# Patient Record
Sex: FEMALE | Race: BLACK OR AFRICAN AMERICAN
[De-identification: names, ages, dates, MRNs, and addresses within clinical notes are randomized per-mention and may not be internally consistent; named-entity substitution may affect disease eponyms.]

---

## 2017-10-24 ENCOUNTER — HOSPITAL ENCOUNTER (EMERGENCY)
Dept: HOSPITAL 62 - ER | Age: 22
Discharge: HOME | End: 2017-10-24
Payer: OTHER GOVERNMENT

## 2017-10-24 VITALS — SYSTOLIC BLOOD PRESSURE: 115 MMHG | DIASTOLIC BLOOD PRESSURE: 87 MMHG

## 2017-10-24 DIAGNOSIS — Z88.0: ICD-10-CM

## 2017-10-24 DIAGNOSIS — Z91.010: ICD-10-CM

## 2017-10-24 DIAGNOSIS — Z88.8: ICD-10-CM

## 2017-10-24 DIAGNOSIS — J45.909: ICD-10-CM

## 2017-10-24 DIAGNOSIS — Z91.018: ICD-10-CM

## 2017-10-24 DIAGNOSIS — R11.2: ICD-10-CM

## 2017-10-24 DIAGNOSIS — B96.81: ICD-10-CM

## 2017-10-24 DIAGNOSIS — K28.9: Primary | ICD-10-CM

## 2017-10-24 DIAGNOSIS — Z98.84: ICD-10-CM

## 2017-10-24 LAB
ALBUMIN SERPL-MCNC: 4.1 G/DL (ref 3.5–5)
ALP SERPL-CCNC: 107 U/L (ref 38–126)
ALT SERPL-CCNC: 151 U/L (ref 9–52)
ANION GAP SERPL CALC-SCNC: 14 MMOL/L (ref 5–19)
APPEARANCE UR: CLEAR
AST SERPL-CCNC: 50 U/L (ref 14–36)
BASOPHILS # BLD AUTO: 0 10^3/UL (ref 0–0.2)
BASOPHILS NFR BLD AUTO: 0.2 % (ref 0–2)
BILIRUB DIRECT SERPL-MCNC: 0.4 MG/DL (ref 0–0.4)
BILIRUB SERPL-MCNC: 0.8 MG/DL (ref 0.2–1.3)
BILIRUB UR QL STRIP: NEGATIVE
BUN SERPL-MCNC: 8 MG/DL (ref 7–20)
CALCIUM: 9.2 MG/DL (ref 8.4–10.2)
CHLORIDE SERPL-SCNC: 107 MMOL/L (ref 98–107)
CO2 SERPL-SCNC: 21 MMOL/L (ref 22–30)
CREAT SERPL-MCNC: 0.74 MG/DL (ref 0.52–1.25)
EOSINOPHIL # BLD AUTO: 0.2 10^3/UL (ref 0–0.6)
EOSINOPHIL NFR BLD AUTO: 2 % (ref 0–6)
ERYTHROCYTE [DISTWIDTH] IN BLOOD BY AUTOMATED COUNT: 14.4 % (ref 11.5–14)
GLUCOSE SERPL-MCNC: 80 MG/DL (ref 75–110)
GLUCOSE UR STRIP-MCNC: NEGATIVE MG/DL
HCT VFR BLD CALC: 38.9 % (ref 36–47)
HGB BLD-MCNC: 13.1 G/DL (ref 12–15.5)
HGB HCT DIFFERENCE: 0.4
KETONES UR STRIP-MCNC: NEGATIVE MG/DL
LIPASE SERPL-CCNC: 51.4 U/L (ref 23–300)
LYMPHOCYTES # BLD AUTO: 3.5 10^3/UL (ref 0.5–4.7)
LYMPHOCYTES NFR BLD AUTO: 32.9 % (ref 13–45)
MCH RBC QN AUTO: 28.5 PG (ref 27–33.4)
MCHC RBC AUTO-ENTMCNC: 33.6 G/DL (ref 32–36)
MCV RBC AUTO: 85 FL (ref 80–97)
MONOCYTES # BLD AUTO: 0.6 10^3/UL (ref 0.1–1.4)
MONOCYTES NFR BLD AUTO: 6 % (ref 3–13)
NEUTROPHILS # BLD AUTO: 6.2 10^3/UL (ref 1.7–8.2)
NEUTS SEG NFR BLD AUTO: 58.9 % (ref 42–78)
NITRITE UR QL STRIP: NEGATIVE
PH UR STRIP: 6 [PH] (ref 5–9)
POTASSIUM SERPL-SCNC: 4.1 MMOL/L (ref 3.6–5)
PROT SERPL-MCNC: 7.8 G/DL (ref 6.3–8.2)
PROT UR STRIP-MCNC: NEGATIVE MG/DL
RBC # BLD AUTO: 4.6 10^6/UL (ref 3.72–5.28)
SODIUM SERPL-SCNC: 141.9 MMOL/L (ref 137–145)
SP GR UR STRIP: 1.01
UROBILINOGEN UR-MCNC: NEGATIVE MG/DL (ref ?–2)
WBC # BLD AUTO: 10.5 10^3/UL (ref 4–10.5)

## 2017-10-24 PROCEDURE — 85025 COMPLETE CBC W/AUTO DIFF WBC: CPT

## 2017-10-24 PROCEDURE — 96361 HYDRATE IV INFUSION ADD-ON: CPT

## 2017-10-24 PROCEDURE — 81025 URINE PREGNANCY TEST: CPT

## 2017-10-24 PROCEDURE — 96374 THER/PROPH/DIAG INJ IV PUSH: CPT

## 2017-10-24 PROCEDURE — 96375 TX/PRO/DX INJ NEW DRUG ADDON: CPT

## 2017-10-24 PROCEDURE — 99284 EMERGENCY DEPT VISIT MOD MDM: CPT

## 2017-10-24 PROCEDURE — 36415 COLL VENOUS BLD VENIPUNCTURE: CPT

## 2017-10-24 PROCEDURE — 81001 URINALYSIS AUTO W/SCOPE: CPT

## 2017-10-24 PROCEDURE — 83690 ASSAY OF LIPASE: CPT

## 2017-10-24 PROCEDURE — 80053 COMPREHEN METABOLIC PANEL: CPT

## 2017-10-24 NOTE — ER DOCUMENT REPORT
ED Medical Screen (RME)





- General


Chief Complaint: Vomiting


Stated Complaint: ABDOMINAL PAIN


Time Seen by Provider: 10/24/17 18:34


Notes: 





Patient complains of severe epigastric abdominal pain.  She states is making 

her nauseous and she has vomiting.  She states she is unable to "keep anything 

down".  She states she is recently diagnosed with H. pylori and is currently 

taking 2 medications for.  She states the pain started before she had that 

diagnosis.  She also states that she just had a menstrual cycle that lasted 

approximately 5 weeks.


TRAVEL OUTSIDE OF THE [place] IN LAST 30 DAYS: No





- Related Data


Allergies/Adverse Reactions: 


 





amoxicillin [Amoxicillin] Allergy (Verified 10/24/17 18:31)


 


lisinopril Allergy (Verified 10/24/17 18:31)


 


peanut [Peanut] Allergy (Verified 10/24/17 18:31)


 


mushrooms Allergy (Uncoded 10/24/17 18:31)


 











Past Medical History





- Social History


Frequency of alcohol use: None


Drug Abuse: None


Pulmonary Medical History: Reports: Hx Asthma


Neurological Medical History: Reports: Hx Migraine


Renal/ Medical History: Reports: Hx Pelvic Inflammatory Disease.  Denies: Hx 

Peritoneal Dialysis


Psychiatric Medical History: Reports: Hx Bipolar Disorder


Past Surgical History: Reports: Hx Dilation and Curettage - elective  3/

11, Hx Gynecologic Surgery - D&C





- Immunizations


Hx Diphtheria, Pertussis, Tetanus Vaccination: Yes

## 2018-08-06 ENCOUNTER — HOSPITAL ENCOUNTER (EMERGENCY)
Dept: HOSPITAL 62 - ER | Age: 23
Discharge: HOME | End: 2018-08-06
Payer: OTHER GOVERNMENT

## 2018-08-06 VITALS — SYSTOLIC BLOOD PRESSURE: 131 MMHG | DIASTOLIC BLOOD PRESSURE: 81 MMHG

## 2018-08-06 DIAGNOSIS — F17.210: ICD-10-CM

## 2018-08-06 DIAGNOSIS — G40.909: Primary | ICD-10-CM

## 2018-08-06 DIAGNOSIS — Z88.0: ICD-10-CM

## 2018-08-06 DIAGNOSIS — R53.1: ICD-10-CM

## 2018-08-06 LAB
ADD MANUAL DIFF: NO
ALBUMIN SERPL-MCNC: 4.1 G/DL (ref 3.5–5)
ALP SERPL-CCNC: 84 U/L (ref 38–126)
ALT SERPL-CCNC: 29 U/L (ref 9–52)
ANION GAP SERPL CALC-SCNC: 12 MMOL/L (ref 5–19)
APPEARANCE UR: CLEAR
APTT PPP: YELLOW S
AST SERPL-CCNC: 21 U/L (ref 14–36)
BARBITURATES UR QL SCN: NEGATIVE
BASOPHILS # BLD AUTO: 0.1 10^3/UL (ref 0–0.2)
BASOPHILS NFR BLD AUTO: 0.8 % (ref 0–2)
BILIRUB DIRECT SERPL-MCNC: 0.2 MG/DL (ref 0–0.4)
BILIRUB SERPL-MCNC: 0.7 MG/DL (ref 0.2–1.3)
BILIRUB UR QL STRIP: NEGATIVE
BUN SERPL-MCNC: 10 MG/DL (ref 7–20)
CALCIUM: 9.3 MG/DL (ref 8.4–10.2)
CHLORIDE SERPL-SCNC: 106 MMOL/L (ref 98–107)
CO2 SERPL-SCNC: 25 MMOL/L (ref 22–30)
EOSINOPHIL # BLD AUTO: 0.2 10^3/UL (ref 0–0.6)
EOSINOPHIL NFR BLD AUTO: 1.7 % (ref 0–6)
ERYTHROCYTE [DISTWIDTH] IN BLOOD BY AUTOMATED COUNT: 14.6 % (ref 11.5–14)
ETHANOL SERPL-MCNC: < 10 MG/DL
GLUCOSE SERPL-MCNC: 76 MG/DL (ref 75–110)
GLUCOSE UR STRIP-MCNC: NEGATIVE MG/DL
HCT VFR BLD CALC: 38.5 % (ref 36–47)
HGB BLD-MCNC: 12.7 G/DL (ref 12–15.5)
KETONES UR STRIP-MCNC: (no result) MG/DL
LYMPHOCYTES # BLD AUTO: 2.9 10^3/UL (ref 0.5–4.7)
LYMPHOCYTES NFR BLD AUTO: 29.5 % (ref 13–45)
MCH RBC QN AUTO: 28.4 PG (ref 27–33.4)
MCHC RBC AUTO-ENTMCNC: 32.9 G/DL (ref 32–36)
MCV RBC AUTO: 86 FL (ref 80–97)
METHADONE UR QL SCN: NEGATIVE
MONOCYTES # BLD AUTO: 0.6 10^3/UL (ref 0.1–1.4)
MONOCYTES NFR BLD AUTO: 6.3 % (ref 3–13)
NEUTROPHILS # BLD AUTO: 6.1 10^3/UL (ref 1.7–8.2)
NEUTS SEG NFR BLD AUTO: 61.7 % (ref 42–78)
NITRITE UR QL STRIP: POSITIVE
PCP UR QL SCN: NEGATIVE
PH UR STRIP: 5 [PH] (ref 5–9)
PLATELET # BLD: 420 10^3/UL (ref 150–450)
POTASSIUM SERPL-SCNC: 4 MMOL/L (ref 3.6–5)
PROT SERPL-MCNC: 7.9 G/DL (ref 6.3–8.2)
PROT UR STRIP-MCNC: NEGATIVE MG/DL
RBC # BLD AUTO: 4.46 10^6/UL (ref 3.72–5.28)
SODIUM SERPL-SCNC: 143 MMOL/L (ref 137–145)
SP GR UR STRIP: 1.02
TOTAL CELLS COUNTED % (AUTO): 100 %
URINE AMPHETAMINES SCREEN: NEGATIVE
URINE BENZODIAZEPINES SCREEN: NEGATIVE
URINE COCAINE SCREEN: NEGATIVE
URINE MARIJUANA (THC) SCREEN: NEGATIVE
UROBILINOGEN UR-MCNC: NEGATIVE MG/DL (ref ?–2)
WBC # BLD AUTO: 9.9 10^3/UL (ref 4–10.5)

## 2018-08-06 PROCEDURE — 85025 COMPLETE CBC W/AUTO DIFF WBC: CPT

## 2018-08-06 PROCEDURE — 36415 COLL VENOUS BLD VENIPUNCTURE: CPT

## 2018-08-06 PROCEDURE — 99285 EMERGENCY DEPT VISIT HI MDM: CPT

## 2018-08-06 PROCEDURE — 80053 COMPREHEN METABOLIC PANEL: CPT

## 2018-08-06 PROCEDURE — 81001 URINALYSIS AUTO W/SCOPE: CPT

## 2018-08-06 PROCEDURE — 84703 CHORIONIC GONADOTROPIN ASSAY: CPT

## 2018-08-06 PROCEDURE — 83735 ASSAY OF MAGNESIUM: CPT

## 2018-08-06 PROCEDURE — 80307 DRUG TEST PRSMV CHEM ANLYZR: CPT

## 2018-08-06 PROCEDURE — 96361 HYDRATE IV INFUSION ADD-ON: CPT

## 2018-08-06 PROCEDURE — 96374 THER/PROPH/DIAG INJ IV PUSH: CPT

## 2018-08-06 NOTE — ER DOCUMENT REPORT
ED Medical Screen (RME)





- General


Chief Complaint: General Weakness


Stated Complaint: SEIZURES, FATIGUE


Time Seen by Provider: 18 15:50


Notes: 





23 years old female presents today with multiple seizures seizures back-to-back 

total of 5 for the last 3 days.  She means a day 5 seizures.  She has run out 

of her Keppra last Thursday.


Postictally feeling lightheaded and sleepy and lethargic.  Denies any fever 

chills cough other constitutional symptoms.  Denies any dysuria frequency 

urgency.


TRAVEL OUTSIDE OF THE [place] IN LAST 30 DAYS: No





- Related Data


Allergies/Adverse Reactions: 


 





amoxicillin [Amoxicillin] Allergy (Verified 18 15:13)


 


lisinopril Allergy (Verified 18 15:13)


 


peanut [Peanut] Allergy (Verified 18 15:13)


 


mushrooms Allergy (Uncoded 10/24/17 18:31)


 











Past Medical History





- Social History


Chew tobacco use (# tins/day): No


Frequency of alcohol use: Social


Drug Abuse: None


Pulmonary Medical History: Reports: Hx Asthma


Neurological Medical History: Reports: Hx Migraine


Renal/ Medical History: Reports: Hx Pelvic Inflammatory Disease.  Denies: Hx 

Peritoneal Dialysis


Psychiatric Medical History: Reports: Hx Bipolar Disorder


Past Surgical History: Reports: Hx Dilation and Curettage - elective  3/

11, Hx Gynecologic Surgery - D&C





- Immunizations


Hx Diphtheria, Pertussis, Tetanus Vaccination: Yes





Physical Exam





- Vital signs


Vitals: 





 











Temp Pulse Resp BP Pulse Ox


 


 98.8 F   83   18   134/112 H  99 


 


 18 15:29  18 15:29  18 15:29  18 15:29  18 15:29














Course





- Vital Signs


Vital signs: 





 











Temp Pulse Resp BP Pulse Ox


 


 98.8 F   83   18   134/112 H  99 


 


 18 15:29  18 15:29  18 15:29  18 15:29  18 15:29














Doctor's Discharge





- Discharge


Referrals: 


ANGELA TREJO MD [Primary Care Provider] - Follow up as needed

## 2018-08-06 NOTE — ER DOCUMENT REPORT
ED Seizure





<CRIS WILSON - Last Filed: 18 19:48>





- General


Mode of Arrival: Ambulatory


Information source: Patient





<YANI YBARRA - Last Filed: 18 20:05>





- General


Chief Complaint: General Weakness


Stated Complaint: SEIZURES, FATIGUE


Time Seen by Provider: 18 15:50


Notes: 


23-year-old female that presents to the emergency department today with 

complaints of seizures for the last few days.  Patient has a known seizure 

disorder and is currently taking Keppra and Vimpat.  Patient states that she 

has not had her Keppra for about a week as her  got them filled and then 

"went onto the field for training" and accidentally took it with him.  Patient 

states she has had multiple seizures over the last few days and now feels "very 

tired". (TATEYANI)





- Related Data


Allergies/Adverse Reactions: 


 





amoxicillin [Amoxicillin] Allergy (Verified 18 15:13)


 


lisinopril Allergy (Verified 18 15:13)


 


peanut [Peanut] Allergy (Verified 18 15:13)


 


mushrooms Allergy (Uncoded 10/24/17 18:31)


 











Past Medical History





- General


Information source: Patient





- Social History


Smoking Status: Current Every Day Smoker


Cigarette use (# per day): Yes


Chew tobacco use (# tins/day): No


Frequency of alcohol use: Social


Drug Abuse: None


Lives with: Family


Family History: Reviewed & Not Pertinent


Patient has suicidal ideation: No


Patient has homicidal ideation: No


Pulmonary Medical History: Reports: Hx Asthma


Neurological Medical History: Reports: Hx Migraine, Hx Seizures


Renal/ Medical History: Reports: Hx Pelvic Inflammatory Disease


Psychiatric Medical History: Reports: Hx Bipolar Disorder


Past Surgical History: Reports: Hx Dilation and Curettage - elective  3/

11, Hx Gynecologic Surgery - D&C





- Immunizations


Hx Diphtheria, Pertussis, Tetanus Vaccination: Yes





<YANI YBARRA - Last Filed: 18 20:05>





Review of Systems





- Review of Systems


Constitutional: No symptoms reported


EENT: No symptoms reported


Cardiovascular: No symptoms reported


Respiratory: No symptoms reported


Gastrointestinal: No symptoms reported


Genitourinary: No symptoms reported


Female Genitourinary: No symptoms reported


Musculoskeletal: No symptoms reported


Skin: No symptoms reported


Hematologic/Lymphatic: No symptoms reported


Neurological/Psychological: See HPI, Seizure


-: Yes All other systems reviewed and negative





<YANI YBARRA - Last Filed: 18 20:05>





Physical Exam





<CRIS WILSON - Last Filed: 18 19:48>





<YANI YBARRA - Last Filed: 18 20:05>





- Vital signs


Vitals: 


 











Temp Pulse Resp BP Pulse Ox


 


 98.8 F   83   18   134/112 H  99 


 


 18 15:29  18 15:29  18 15:29  18 15:29  18 15:29














- Notes


Notes: 





Physical Exam:


 


General: Alert, appears well. 


 


HEENT: Normocephalic. Atraumatic. PERRL. Extraocular movements intact. 

Oropharynx clear.


 


Neck: Supple. Non-tender.


 


Respiratory: No respiratory distress. Clear and equal breath sounds bilaterally.


 


Cardiovascular: Regular rate and rhythm. 


 


Abdominal: Normal Inspection. Non-tender. No distension. Normal Bowel Sounds. 


 


Back: Non-tender. No deformity or step off.


 


Extremities: Moves all four extremities.


Upper extremities: Normal inspection. Normal ROM.  


Lower extremities: Normal inspection. No edema. Normal ROM.


 


Neurological: Normal cognition. AAOx4. Initially deliberate speech that was 

difficult to understand, speech has normalized.


 


Psychological: Normal affect. Normal Mood. 


 


Skin: Warm. Dry. Normal color. (YANI YBARRA)





Course





- Laboratory


Result Diagrams: 


 18 16:24





 18 17:49





<CRIS WILSON - Last Filed: 18 19:48>





- Laboratory


Result Diagrams: 


 18 16:24





 18 17:49





<YANI YBARRA - Last Filed: 18 20:05>





- Re-evaluation


Re-evalutation: 





18 19:48


The patient states that her  is home from the field and that he can 

definitely go out to enable hospital pharmacy and  her prescription.  

There was a prescription for Keppra 750 mg #60 filled on 2018 but by her 

history is never picked up.


She normally takes 1000 mg 3 times a day, so I told her she can take 2 of the 

750 mg Keppra twice daily and get the same dose until she can see a primary 

care provider to reinstate her regular prescription.


She will also discuss the problem with the Vimpat with a counselor at a [facility].(According to the patient, [facility] pharmacy did not carry Vimpat

, but the neurologist she had been seeing was giving her samples from the office

).





The patient was sound asleep when I woke her up to discuss disposition.  She is 

actually more alert smiling with normal speech pattern now which she did not 

have initially.  She states she has a friend in the waiting room that will take 

her home.  She is smiling and quite happy to be going home now.


 (CRIS WILSON)





- Vital Signs


Vital signs: 


 











Temp Pulse Resp BP Pulse Ox


 


 98.8 F   83   26 H  130/119 H  96 


 


 18 15:29  18 15:29  18 19:01  18 19:01  18 19:01














- Laboratory


Laboratory results interpreted by me: 


 











  18





  16:24 18:44


 


RDW  14.6 H 


 


Urine Ketones   TRACE H


 


Urine Nitrite   POSITIVE H














Discharge





<CRIS WILSON - Last Filed: 18 19:48>





<YANI YBARRA - Last Filed: 18 20:05>





- Discharge


Clinical Impression: 


 Seizures, Has run out of medications





Condition: Stable


Disposition: HOME, SELF-CARE


Additional Instructions: 


Seizure, Known Epileptic





     You have had a seizure.  Seizures may "break through" in an epileptic due 

to stress of infection or injury, a change in blood chemistry, or drug and 

alcohol use.  Another common cause is failure to take medication as prescribed.


     Your doctor has evaluated your situation for the likely cause of this 

seizure.  It is important that you follow his advice concerning any medication 

changes and follow-up care.  Further testing of anti-seizure medication levels 

in your blood may be necessary.


     If you have a 's license, it's important that you DO NOT DRIVE until 

given permission by your physician.  This seizure must be reported to the 

's license bureau.


     Call the doctor or return if seizures recur, or if new or unusual symptoms 

arise -- such as severe headache, confusion, excessive sleepiness, local 

weakness or numbness, neck stiffness, or fever.











********************************************************************************

***************************************





Be sure you get your Keppra prescription from the [facility] so you 

can continue taking the medicine today.


Follow-up with your primary care provider to discuss how to manage your seizure 

medications.





RETURN TO THE EMERGENCY ROOM IF ANY NEW OR WORSENING SYMPTOMS.








Bobby Attestation: 





18 17:10


I personally performed the services described in the documentation, reviewed 

and edited the documentation which was dictated to the scribe in my presence, 

and it accurately records my words and actions. (YANI YBARRA)





Scribe Documentation





- Scribe


Written by Bobby:: Bobby Fragoso, 2018


acting as scribe for :: Niall





<YANI YBARRA - Last Filed: 18 20:05>

## 2018-10-10 ENCOUNTER — HOSPITAL ENCOUNTER (EMERGENCY)
Dept: HOSPITAL 62 - ER | Age: 23
Discharge: HOME | End: 2018-10-10
Payer: OTHER GOVERNMENT

## 2018-10-10 VITALS — SYSTOLIC BLOOD PRESSURE: 138 MMHG | DIASTOLIC BLOOD PRESSURE: 117 MMHG

## 2018-10-10 DIAGNOSIS — Z88.0: ICD-10-CM

## 2018-10-10 DIAGNOSIS — Z91.010: ICD-10-CM

## 2018-10-10 DIAGNOSIS — R11.0: ICD-10-CM

## 2018-10-10 DIAGNOSIS — R06.02: ICD-10-CM

## 2018-10-10 DIAGNOSIS — R07.9: Primary | ICD-10-CM

## 2018-10-10 DIAGNOSIS — F17.200: ICD-10-CM

## 2018-10-10 DIAGNOSIS — I10: ICD-10-CM

## 2018-10-10 LAB
ADD MANUAL DIFF: NO
ALBUMIN SERPL-MCNC: 3.9 G/DL (ref 3.5–5)
ALP SERPL-CCNC: 76 U/L (ref 38–126)
ALT SERPL-CCNC: 20 U/L (ref 9–52)
ANION GAP SERPL CALC-SCNC: 9 MMOL/L (ref 5–19)
AST SERPL-CCNC: 18 U/L (ref 14–36)
BASOPHILS # BLD AUTO: 0 10^3/UL (ref 0–0.2)
BASOPHILS NFR BLD AUTO: 0.3 % (ref 0–2)
BILIRUB DIRECT SERPL-MCNC: 0.3 MG/DL (ref 0–0.4)
BILIRUB SERPL-MCNC: 0.6 MG/DL (ref 0.2–1.3)
BUN SERPL-MCNC: 11 MG/DL (ref 7–20)
CALCIUM: 9.6 MG/DL (ref 8.4–10.2)
CHLORIDE SERPL-SCNC: 107 MMOL/L (ref 98–107)
CK MB SERPL-MCNC: 0.23 NG/ML (ref ?–4.55)
CK SERPL-CCNC: 59 U/L (ref 30–135)
CO2 SERPL-SCNC: 25 MMOL/L (ref 22–30)
EOSINOPHIL # BLD AUTO: 0.4 10^3/UL (ref 0–0.6)
EOSINOPHIL NFR BLD AUTO: 3.3 % (ref 0–6)
ERYTHROCYTE [DISTWIDTH] IN BLOOD BY AUTOMATED COUNT: 14.7 % (ref 11.5–14)
GLUCOSE SERPL-MCNC: 87 MG/DL (ref 75–110)
HCT VFR BLD CALC: 35.2 % (ref 36–47)
HGB BLD-MCNC: 11.8 G/DL (ref 12–15.5)
LYMPHOCYTES # BLD AUTO: 3.4 10^3/UL (ref 0.5–4.7)
LYMPHOCYTES NFR BLD AUTO: 31.7 % (ref 13–45)
MCH RBC QN AUTO: 28.7 PG (ref 27–33.4)
MCHC RBC AUTO-ENTMCNC: 33.6 G/DL (ref 32–36)
MCV RBC AUTO: 85 FL (ref 80–97)
MONOCYTES # BLD AUTO: 0.6 10^3/UL (ref 0.1–1.4)
MONOCYTES NFR BLD AUTO: 6 % (ref 3–13)
NEUTROPHILS # BLD AUTO: 6.3 10^3/UL (ref 1.7–8.2)
NEUTS SEG NFR BLD AUTO: 58.7 % (ref 42–78)
PLATELET # BLD: 396 10^3/UL (ref 150–450)
POTASSIUM SERPL-SCNC: 4.6 MMOL/L (ref 3.6–5)
PROT SERPL-MCNC: 7.8 G/DL (ref 6.3–8.2)
RBC # BLD AUTO: 4.12 10^6/UL (ref 3.72–5.28)
SODIUM SERPL-SCNC: 140.8 MMOL/L (ref 137–145)
TOTAL CELLS COUNTED % (AUTO): 100 %
TROPONIN I SERPL-MCNC: < 0.012 NG/ML
WBC # BLD AUTO: 10.7 10^3/UL (ref 4–10.5)

## 2018-10-10 PROCEDURE — 85025 COMPLETE CBC W/AUTO DIFF WBC: CPT

## 2018-10-10 PROCEDURE — 80053 COMPREHEN METABOLIC PANEL: CPT

## 2018-10-10 PROCEDURE — 82553 CREATINE MB FRACTION: CPT

## 2018-10-10 PROCEDURE — 99284 EMERGENCY DEPT VISIT MOD MDM: CPT

## 2018-10-10 PROCEDURE — 84484 ASSAY OF TROPONIN QUANT: CPT

## 2018-10-10 PROCEDURE — 82550 ASSAY OF CK (CPK): CPT

## 2018-10-10 PROCEDURE — 93010 ELECTROCARDIOGRAM REPORT: CPT

## 2018-10-10 PROCEDURE — 36415 COLL VENOUS BLD VENIPUNCTURE: CPT

## 2018-10-10 PROCEDURE — 93005 ELECTROCARDIOGRAM TRACING: CPT

## 2018-10-10 PROCEDURE — 71046 X-RAY EXAM CHEST 2 VIEWS: CPT

## 2018-10-10 NOTE — EKG REPORT
SEVERITY:- BORDERLINE ECG -

SINUS RHYTHM

BORDERLINE T ABNORMALITIES, INFERIOR LEADS

:

Confirmed by: Patsy Way MD 10-Oct-2018 18:14:47

## 2018-10-10 NOTE — RADIOLOGY REPORT (SQ)
EXAM DESCRIPTION:  CHEST 2 VIEWS



COMPLETED DATE/TIME:  10/10/2018 5:53 pm



REASON FOR STUDY:  chest pain



COMPARISON:  None.



EXAM PARAMETERS:  NUMBER OF VIEWS: two views

TECHNIQUE: Digital Frontal and Lateral radiographic views of the chest acquired.

RADIATION DOSE: NA

LIMITATIONS: none



FINDINGS:  LUNGS AND PLEURA: No opacities, masses or pneumothorax. No pleural effusion.

MEDIASTINUM AND HILAR STRUCTURES: No masses or contour abnormalities.

HEART AND VASCULAR STRUCTURES: Heart normal size.  No evidence for failure.

BONES: No acute findings.

HARDWARE: None in the chest.

OTHER: No other significant finding.



IMPRESSION:  NO ACUTE RADIOGRAPHIC FINDING IN THE CHEST.



TECHNICAL DOCUMENTATION:  JOB ID:  [id number]

[id number]

 2011 [employer]- All Rights Reserved



Reading location - IP/workstation name: [email or web address]

## 2018-10-10 NOTE — ER DOCUMENT REPORT
ED General





- General


Chief Complaint: Chest Pain


Stated Complaint: CHEST PAIN, SHORTNESS OF BREATH


Time Seen by Provider: 10/10/18 17:04


TRAVEL OUTSIDE OF THE [place] IN LAST 30 DAYS: No





- HPI


Patient complains to provider of: Chest wall pain shortness of breath


Notes: 





Patient coming in for chest wall pain shortness of breath states ongoing for 

the last 2 days.  Patient states left side of her chest is like it is burning 

very sensitive to touch.  Patient states causing her to have some shortness of 

breath.  Patient does smoke denies any recent travel denies any fevers chills 

nausea vomiting diarrhea.  Patient resting comfortably upon my evaluation.  

Patient denies any recent trauma.  Denies any recent illnesses no URIs.  

Patient states the left chest wall is very tender to touch reproducing the 

burning sensation.  Denies any rashes





- Related Data


Allergies/Adverse Reactions: 


 





amoxicillin [Amoxicillin] Allergy (Verified 18 15:13)


 


lisinopril Allergy (Verified 18 15:13)


 


peanut [Peanut] Allergy (Verified 18 15:13)


 


mushrooms Allergy (Uncoded 10/24/17 18:31)


 











Past Medical History





- Social History


Smoking Status: Current Every Day Smoker


Chew tobacco use (# tins/day): No


Frequency of alcohol use: Social


Family History: Reviewed & Not Pertinent


Patient has suicidal ideation: No


Patient has homicidal ideation: No





- Past Medical History


Cardiac Medical History: Reports: Hx Hypertension


Pulmonary Medical History: Reports: Hx Asthma


Neurological Medical History: Reports: Hx Migraine, Hx Seizures


Renal/ Medical History: Reports: Hx Pelvic Inflammatory Disease.  Denies: Hx 

Peritoneal Dialysis


Psychiatric Medical History: Reports: Hx Bipolar Disorder


Past Surgical History: Reports: Hx Dilation and Curettage - elective  3/

11, Hx Gynecologic Surgery - D&C





- Immunizations


Hx Diphtheria, Pertussis, Tetanus Vaccination: Yes





Review of Systems





- Review of Systems


Constitutional: No symptoms reported


EENT: No symptoms reported


Cardiovascular: Chest pain


Respiratory: Short of breath


Gastrointestinal: No symptoms reported


Genitourinary: No symptoms reported


Female Genitourinary: No symptoms reported


Musculoskeletal: No symptoms reported


Skin: No symptoms reported


Hematologic/Lymphatic: No symptoms reported


Neurological/Psychological: No symptoms reported





Physical Exam





- Vital signs


Vitals: 


 











Temp Pulse Resp BP Pulse Ox


 


 99.1 F   95   18   138/117 H  100 


 


 10/10/18 16:32  10/10/18 16:32  10/10/18 16:32  10/10/18 16:32  10/10/18 16:32











Interpretation: Normal





- General


General appearance: Appears well, Alert





- HEENT


Head: Normocephalic, Atraumatic


Eyes: Normal


Pupils: PERRL





- Respiratory


Respiratory status: No respiratory distress


Chest status: Tender - Palpation of the left side of the chest wall reproduces 

patient's pain


Breath sounds: Normal


Chest palpation: Normal





- Cardiovascular


Rhythm: Regular


Heart sounds: Normal auscultation


Murmur: No





- Abdominal


Inspection: Normal


Distension: No distension


Bowel sounds: Normal


Tenderness: Nontender


Organomegaly: No organomegaly





- Back


Back: Normal, Nontender





- Extremities


General upper extremity: Normal inspection, Nontender, Normal color, Normal ROM

, Normal temperature


General lower extremity: Normal inspection, Nontender, Normal color, Normal ROM

, Normal temperature, Normal weight bearing.  No: Serg's sign





- Neurological


Neuro grossly intact: Yes


Cognition: Normal


Orientation: AAOx4


[place] Coma Scale Eye Opening: Spontaneous


[place] Coma Scale Verbal: Oriented


[place] Coma Scale Motor: Obeys Commands


[place] Coma Scale Total: 15


Speech: Normal


Motor strength normal: LUE, RUE, LLE, RLE


Sensory: Normal





- Psychological


Associated symptoms: Normal affect, Normal mood





- Skin


Skin Temperature: Warm


Skin Moisture: Dry


Skin Color: Normal





Course





- Re-evaluation


Re-evalutation: 





10/11/18 02:16


The patient has atypical chest pain as the patient's chest pain is not 

suggestive of pulmonary embolus, cardiac ischemia, aortic dissection, or other 

serious etiology. Given the extremely low risk of these diagnoses further 

testing and evaluation for these possibilities does not appear to be indicated 

at this time. The patient has been instructed to return if the symptoms worsen 

or change in any way.  GI cocktail was given with no relief of the burning 

sensation more likely muscle skeletal patient is to continue with anti-

inflammatory medication along with Tylenol.  States understanding patient 

discharged home.





- Vital Signs


Vital signs: 


 











Temp Pulse Resp BP Pulse Ox


 


 99.1 F   95   18   138/117 H  100 


 


 10/10/18 16:32  10/10/18 16:32  10/10/18 16:32  10/10/18 16:32  10/10/18 16:32














- Laboratory


Result Diagrams: 


 10/10/18 17:55





 10/10/18 17:55


Laboratory results interpreted by me: 


 











  10/10/18





  17:55


 


WBC  10.7 H


 


Hgb  11.8 L


 


Hct  35.2 L


 


RDW  14.7 H














Discharge





- Discharge


Clinical Impression: 


 chest wall burning, Nausea





Condition: Good


Instructions:  Anti-Inflammatory Medication ([facility]), Chest Wall Pain ([facility]), Chest 

Pain of Unclear Cause ([facility])


Additional Instructions: 


Your evaluation does not show any signs of significant pathology.  No signs of 

cardiac ischemia no signs of infection or pneumonia.  I recommend that you take 

Tylenol Motrin for pain control Reglan for any nausea follow-up with your 

primary care physician.  Pain may last for 1-2 weeks this muscle skeletal.


Prescriptions: 


Ibuprofen [Motrin 600 mg Tablet] 600 mg PO Q8HP PRN #21 tablet


 PRN Reason: 


Metoclopramide HCl [Reglan] 5 mg PO Q6 #30 tablet


Forms:  Return to Work

## 2018-10-10 NOTE — ER DOCUMENT REPORT
ED Medical Screen (RME)





- General


Chief Complaint: Chest Pain


Stated Complaint: CHEST PAIN, SHORTNESS OF BREATH


Time Seen by Provider: 10/10/18 17:04


TRAVEL OUTSIDE OF THE [place] IN LAST 30 DAYS: No





- HPI


Patient complains to provider of: Chest pain


Onset: Other - There is a 23-year-old female who presents for evaluation of 

chest pain, she has a history of asthma as well as hypertension and cigarette 

smoking, she said she "changed smokes" nearly a pack and half a day. She has 

been having pain on the left side of her chest over the last 2 days which 

occasionally shoots down the left side of her body intermittently causing some 

numbness with it. She denies any fevers or chills, she does endorse some 

shortness of breath, denies any abdominal pain, diarrhea constipation or 

dysuria.  She has not had any wheezing associated with this.





- Related Data


Allergies/Adverse Reactions: 


 





amoxicillin [Amoxicillin] Allergy (Verified 18 15:13)


 


lisinopril Allergy (Verified 18 15:13)


 


peanut [Peanut] Allergy (Verified 18 15:13)


 


mushrooms Allergy (Uncoded 10/24/17 18:31)


 











Past Medical History





- Social History


Chew tobacco use (# tins/day): No


Frequency of alcohol use: Social





- Past Medical History


Cardiac Medical History: Reports: Hx Hypertension


Pulmonary Medical History: Reports: Hx Asthma


Neurological Medical History: Reports: Hx Migraine, Hx Seizures


Renal/ Medical History: Reports: Hx Pelvic Inflammatory Disease.  Denies: Hx 

Peritoneal Dialysis


Psychiatric Medical History: Reports: Hx Bipolar Disorder


Past Surgical History: Reports: Hx Dilation and Curettage - elective  3/

11, Hx Gynecologic Surgery - D&C





- Immunizations


Hx Diphtheria, Pertussis, Tetanus Vaccination: Yes





Physical Exam





- Vital signs


Vitals: 





 











Temp Pulse Resp BP Pulse Ox


 


 99.1 F   95   18   138/117 H  100 


 


 10/10/18 16:32  10/10/18 16:32  10/10/18 16:32  10/10/18 16:32  10/10/18 16:32














Course





- Re-evaluation


Re-evalutation: 





10/10/18 17:14


This is a 23-year-old female with asthma hypertension and chain smoking.


She presents for evaluation of left-sided chest pain.


Has no known cardiac history.


Will obtain EKG, chest x-ray two-view, cardiac monitoring, blood work with a 

troponin.


I performed a rapid medical screening examination on this patient will defer 

further disposition determination workup and labs to next provider.





- Vital Signs


Vital signs: 





 











Temp Pulse Resp BP Pulse Ox


 


 99.1 F   95   18   138/117 H  100 


 


 10/10/18 16:32  10/10/18 16:32  10/10/18 16:32  10/10/18 16:32  10/10/18 16:32

## 2018-11-30 ENCOUNTER — HOSPITAL ENCOUNTER (EMERGENCY)
Dept: HOSPITAL 62 - ER | Age: 23
Discharge: LEFT BEFORE DISCHARGE | End: 2018-11-30
Payer: OTHER GOVERNMENT

## 2018-11-30 VITALS — SYSTOLIC BLOOD PRESSURE: 128 MMHG | DIASTOLIC BLOOD PRESSURE: 101 MMHG

## 2018-11-30 DIAGNOSIS — Z53.21: Primary | ICD-10-CM

## 2019-01-04 ENCOUNTER — HOSPITAL ENCOUNTER (EMERGENCY)
Dept: HOSPITAL 62 - ER | Age: 24
Discharge: LEFT BEFORE DISCHARGE | End: 2019-01-04
Payer: OTHER GOVERNMENT

## 2019-01-04 VITALS — DIASTOLIC BLOOD PRESSURE: 63 MMHG | SYSTOLIC BLOOD PRESSURE: 98 MMHG

## 2019-01-04 DIAGNOSIS — Z53.21: Primary | ICD-10-CM
